# Patient Record
Sex: MALE | Race: WHITE
[De-identification: names, ages, dates, MRNs, and addresses within clinical notes are randomized per-mention and may not be internally consistent; named-entity substitution may affect disease eponyms.]

---

## 2019-01-25 ENCOUNTER — HOSPITAL ENCOUNTER (OUTPATIENT)
Dept: HOSPITAL 47 - RADMRIMAIN | Age: 64
End: 2019-01-25
Attending: ORTHOPAEDIC SURGERY
Payer: COMMERCIAL

## 2019-01-25 DIAGNOSIS — M25.512: Primary | ICD-10-CM

## 2019-01-25 NOTE — MR
EXAMINATION TYPE: MR shoulder LT wo con

 

DATE OF EXAM: 1/25/2019

 

COMPARISON: Plain film 1/18/2019

 

HISTORY: Lt shoulder pain x 6 mos, no trauma

 

TECHNIQUE: Multiplanar, multisequence imaging of the left shoulder is performed without contrast.

 

FINDINGS:

 

Rotator Cuff: Abnormal thickening of the insertion of the supraspinatus tendon is noted with abnormal
 increased signal. Small punctate focus of fluid signal present at the level of insertion could repre
sent a minute partial tear. Hypertrophic change at the acromioclavicular joint causes mass effect on 
the musculotendinous junction of supraspinatus.

 

Acromioclavicular Joint: Hypertrophic changes are present, there is resorption at the acromioclavicul
ar joint as well as hyperintensity on T2-weighted sequence, there is erosive change, correlate to exc
lude infection.

 

Glenohumeral Joint: Intact

 

Labrum: The labrum appears grossly intact given limitation of non-arthrogram study.

 

Biceps Tendon: The long head of biceps is in normal location within bicipital groove. Fluid signal is
 present around the long head of biceps tendon

 

Bone marrow signal: Pseudocysts are present within the humeral head.

 

Other: No additional significant abnormality is appreciated.

 

IMPRESSION: 

Possible small partial tear at the insertion of the supraspinatus tendon, there is tendinosis of the 
tendon. Acromioclavicular joint arthropathy as described.